# Patient Record
Sex: MALE | URBAN - METROPOLITAN AREA
[De-identification: names, ages, dates, MRNs, and addresses within clinical notes are randomized per-mention and may not be internally consistent; named-entity substitution may affect disease eponyms.]

---

## 2023-10-19 ENCOUNTER — HOSPITAL ENCOUNTER (EMERGENCY)
Facility: HOSPITAL | Age: 83
Discharge: HOME | End: 2023-10-19
Attending: EMERGENCY MEDICINE
Payer: MEDICARE

## 2023-10-19 ENCOUNTER — APPOINTMENT (OUTPATIENT)
Dept: RADIOLOGY | Facility: HOSPITAL | Age: 83
End: 2023-10-19
Payer: MEDICARE

## 2023-10-19 VITALS
HEART RATE: 97 BPM | DIASTOLIC BLOOD PRESSURE: 84 MMHG | OXYGEN SATURATION: 98 % | HEIGHT: 68 IN | WEIGHT: 231.48 LBS | TEMPERATURE: 96.4 F | SYSTOLIC BLOOD PRESSURE: 153 MMHG | RESPIRATION RATE: 18 BRPM | BODY MASS INDEX: 35.08 KG/M2

## 2023-10-19 DIAGNOSIS — S09.90XA HEAD TRAUMA, INITIAL ENCOUNTER: ICD-10-CM

## 2023-10-19 DIAGNOSIS — S01.01XA SCALP LACERATION, INITIAL ENCOUNTER: Primary | ICD-10-CM

## 2023-10-19 LAB
ALBUMIN SERPL BCP-MCNC: 3.9 G/DL (ref 3.4–5)
ALP SERPL-CCNC: 42 U/L (ref 33–136)
ALT SERPL W P-5'-P-CCNC: 21 U/L (ref 10–52)
ANION GAP SERPL CALC-SCNC: 13 MMOL/L (ref 10–20)
APPEARANCE UR: ABNORMAL
AST SERPL W P-5'-P-CCNC: 19 U/L (ref 9–39)
BACTERIA #/AREA URNS AUTO: ABNORMAL /HPF
BASOPHILS # BLD AUTO: 0.04 X10*3/UL (ref 0–0.1)
BASOPHILS NFR BLD AUTO: 0.3 %
BILIRUB SERPL-MCNC: 0.7 MG/DL (ref 0–1.2)
BILIRUB UR STRIP.AUTO-MCNC: NEGATIVE MG/DL
BUN SERPL-MCNC: 32 MG/DL (ref 6–23)
CALCIUM SERPL-MCNC: 9 MG/DL (ref 8.6–10.3)
CARDIAC TROPONIN I PNL SERPL HS: 14 NG/L (ref 0–20)
CHLORIDE SERPL-SCNC: 105 MMOL/L (ref 98–107)
CO2 SERPL-SCNC: 21 MMOL/L (ref 21–32)
COLOR UR: ABNORMAL
CREAT SERPL-MCNC: 1.46 MG/DL (ref 0.5–1.3)
EOSINOPHIL # BLD AUTO: 0.12 X10*3/UL (ref 0–0.4)
EOSINOPHIL NFR BLD AUTO: 0.8 %
ERYTHROCYTE [DISTWIDTH] IN BLOOD BY AUTOMATED COUNT: 13.2 % (ref 11.5–14.5)
GFR SERPL CREATININE-BSD FRML MDRD: 47 ML/MIN/1.73M*2
GLUCOSE SERPL-MCNC: 174 MG/DL (ref 74–99)
GLUCOSE UR STRIP.AUTO-MCNC: NEGATIVE MG/DL
HCT VFR BLD AUTO: 40.2 % (ref 41–52)
HGB BLD-MCNC: 13 G/DL (ref 13.5–17.5)
IMM GRANULOCYTES # BLD AUTO: 0.09 X10*3/UL (ref 0–0.5)
IMM GRANULOCYTES NFR BLD AUTO: 0.6 % (ref 0–0.9)
KETONES UR STRIP.AUTO-MCNC: ABNORMAL MG/DL
LEUKOCYTE ESTERASE UR QL STRIP.AUTO: NEGATIVE
LYMPHOCYTES # BLD AUTO: 1.55 X10*3/UL (ref 0.8–3)
LYMPHOCYTES NFR BLD AUTO: 10 %
MCH RBC QN AUTO: 30.7 PG (ref 26–34)
MCHC RBC AUTO-ENTMCNC: 32.3 G/DL (ref 32–36)
MCV RBC AUTO: 95 FL (ref 80–100)
MONOCYTES # BLD AUTO: 1.02 X10*3/UL (ref 0.05–0.8)
MONOCYTES NFR BLD AUTO: 6.6 %
NEUTROPHILS # BLD AUTO: 12.65 X10*3/UL (ref 1.6–5.5)
NEUTROPHILS NFR BLD AUTO: 81.7 %
NITRITE UR QL STRIP.AUTO: POSITIVE
NRBC BLD-RTO: 0 /100 WBCS (ref 0–0)
PH UR STRIP.AUTO: 6 [PH]
PLATELET # BLD AUTO: 184 X10*3/UL (ref 150–450)
PMV BLD AUTO: 10.5 FL (ref 7.5–11.5)
POTASSIUM SERPL-SCNC: 5.2 MMOL/L (ref 3.5–5.3)
PROT SERPL-MCNC: 7.2 G/DL (ref 6.4–8.2)
PROT UR STRIP.AUTO-MCNC: NEGATIVE MG/DL
RBC # BLD AUTO: 4.24 X10*6/UL (ref 4.5–5.9)
RBC # UR STRIP.AUTO: ABNORMAL /UL
RBC #/AREA URNS AUTO: >20 /HPF
SODIUM SERPL-SCNC: 134 MMOL/L (ref 136–145)
SP GR UR STRIP.AUTO: 1.02
SQUAMOUS #/AREA URNS AUTO: ABNORMAL /HPF
UROBILINOGEN UR STRIP.AUTO-MCNC: 2 MG/DL
WBC # BLD AUTO: 15.5 X10*3/UL (ref 4.4–11.3)
WBC #/AREA URNS AUTO: ABNORMAL /HPF

## 2023-10-19 PROCEDURE — 81001 URINALYSIS AUTO W/SCOPE: CPT | Performed by: PHYSICIAN ASSISTANT

## 2023-10-19 PROCEDURE — 96360 HYDRATION IV INFUSION INIT: CPT

## 2023-10-19 PROCEDURE — 36415 COLL VENOUS BLD VENIPUNCTURE: CPT | Performed by: PHYSICIAN ASSISTANT

## 2023-10-19 PROCEDURE — 99285 EMERGENCY DEPT VISIT HI MDM: CPT | Mod: 25 | Performed by: EMERGENCY MEDICINE

## 2023-10-19 PROCEDURE — 70450 CT HEAD/BRAIN W/O DYE: CPT | Performed by: RADIOLOGY

## 2023-10-19 PROCEDURE — 80053 COMPREHEN METABOLIC PANEL: CPT | Performed by: PHYSICIAN ASSISTANT

## 2023-10-19 PROCEDURE — 87086 URINE CULTURE/COLONY COUNT: CPT | Mod: AHULAB,CMCLAB | Performed by: PHYSICIAN ASSISTANT

## 2023-10-19 PROCEDURE — 85025 COMPLETE CBC W/AUTO DIFF WBC: CPT | Performed by: PHYSICIAN ASSISTANT

## 2023-10-19 PROCEDURE — 71046 X-RAY EXAM CHEST 2 VIEWS: CPT | Mod: FOREIGN READ | Performed by: RADIOLOGY

## 2023-10-19 PROCEDURE — 70450 CT HEAD/BRAIN W/O DYE: CPT

## 2023-10-19 PROCEDURE — 12002 RPR S/N/AX/GEN/TRNK2.6-7.5CM: CPT

## 2023-10-19 PROCEDURE — 71046 X-RAY EXAM CHEST 2 VIEWS: CPT | Mod: FR

## 2023-10-19 PROCEDURE — 72125 CT NECK SPINE W/O DYE: CPT | Performed by: RADIOLOGY

## 2023-10-19 PROCEDURE — 2500000004 HC RX 250 GENERAL PHARMACY W/ HCPCS (ALT 636 FOR OP/ED): Performed by: PHYSICIAN ASSISTANT

## 2023-10-19 PROCEDURE — 72125 CT NECK SPINE W/O DYE: CPT

## 2023-10-19 PROCEDURE — 84484 ASSAY OF TROPONIN QUANT: CPT | Performed by: PHYSICIAN ASSISTANT

## 2023-10-19 RX ADMIN — SODIUM CHLORIDE 500 ML: 9 INJECTION, SOLUTION INTRAVENOUS at 20:58

## 2023-10-19 ASSESSMENT — COLUMBIA-SUICIDE SEVERITY RATING SCALE - C-SSRS
2. HAVE YOU ACTUALLY HAD ANY THOUGHTS OF KILLING YOURSELF?: NO
6. HAVE YOU EVER DONE ANYTHING, STARTED TO DO ANYTHING, OR PREPARED TO DO ANYTHING TO END YOUR LIFE?: NO
1. IN THE PAST MONTH, HAVE YOU WISHED YOU WERE DEAD OR WISHED YOU COULD GO TO SLEEP AND NOT WAKE UP?: NO

## 2023-10-19 ASSESSMENT — PAIN SCALES - GENERAL: PAINLEVEL_OUTOF10: 0 - NO PAIN

## 2023-10-19 ASSESSMENT — PAIN - FUNCTIONAL ASSESSMENT: PAIN_FUNCTIONAL_ASSESSMENT: 0-10

## 2023-10-19 ASSESSMENT — PAIN DESCRIPTION - LOCATION: LOCATION: HEAD

## 2023-10-20 NOTE — ED PROCEDURE NOTE
Procedure  Laceration Repair    Performed by: Maria R Grimaldo MD  Authorized by: Antony Mackay MD    Consent:     Consent obtained:  Verbal    Consent given by:  Patient    Risks, benefits, and alternatives were discussed: yes      Risks discussed:  Infection, poor cosmetic result, pain, need for additional repair and poor wound healing    Alternatives discussed:  Observation  Universal protocol:     Procedure explained and questions answered to patient or proxy's satisfaction: yes      Relevant documents present and verified: yes      Test results available: yes      Imaging studies available: yes      Required blood products, implants, devices, and special equipment available: yes      Site/side marked: yes      Immediately prior to procedure, a time out was called: yes      Patient identity confirmed:  Verbally with patient, arm band and hospital-assigned identification number  Anesthesia:     Anesthesia method:  Local infiltration    Local anesthetic:  Lidocaine 1% w/o epi  Laceration details:     Location:  Scalp    Scalp location:  Frontal    Length (cm):  4    Depth (mm):  1  Pre-procedure details:     Preparation:  Patient was prepped and draped in usual sterile fashion and imaging obtained to evaluate for foreign bodies  Exploration:     Limited defect created (wound extended): no      Hemostasis achieved with:  Direct pressure    Imaging outcome: foreign body not noted      Wound exploration: entire depth of wound visualized      Wound extent: no nerve damage noted, no tendon damage noted, no underlying fracture noted and no vascular damage noted      Contaminated: no    Treatment:     Area cleansed with:  Saline    Amount of cleaning:  Extensive    Irrigation solution:  Sterile saline    Irrigation method:  Pressure wash  Skin repair:     Repair method:  Sutures    Suture size:  4-0    Suture technique:  Simple interrupted    Number of sutures:  6  Approximation:     Approximation:  Close  Repair  type:     Repair type:  Simple  Post-procedure details:     Dressing:  Antibiotic ointment    Procedure completion:  Tolerated well, no immediate complications               Maria R Grimaldo MD  Resident  10/19/23 2012       Maira R Grimaldo MD  Resident  10/19/23 1058

## 2023-10-20 NOTE — ED PROVIDER NOTES
CC: Laceration (1 1/2 inch lac to forehead he was at the gas station and went to  his gas cap and lost his ballance )     HPI:   Patient is a 82-year-old male with past medical history of hypertension, diabetes, A-fib presenting due to a laceration on his left forehead.  Patient reports that he was feeling gas and when he went to  the gas Off the floor he lost his balance and fell on his left side and sustained a laceration.  Patient's family reports that he gets lightheaded when he changes positions rapidly from bending over to standing up quickly.  They believe this is what happened to him earlier this evening.  Patient's last tetanus was 3 years ago and is up-to-date.  Patient has had sustained multiple lacerations over the years due to losing his balance and has not been formally worked up yet.  Patient otherwise denies any chest pain, shortness of breath, abdominal pain, pain or weakness in his upper or lower extremities.  Patient has a mild headache and some neck pain that is not midline in his C-spine.  Patient takes all of his medications at baseline.    Limitations to History: none  Additional History Obtained from: family at bedside    PMHx/PSHx:  Per HPI.   - has no past medical history on file.  - has no past surgical history on file.    Social History:  - Tobacco:  has no history on file for tobacco use.   - Alcohol:  has no history on file for alcohol use.   - Drugs:  has no history on file for drug use.     Medications: Reviewed in EMR.     Allergies:  Patient has no known allergies.    ???????????????????????????????????????????????????????????????  Triage Vitals:  T 35.8 °C (96.4 °F)  HR 96  BP    RR (!) 95  O2 95 %      Physical Exam  Constitutional:       Appearance: Normal appearance.   HENT:      Head: Normocephalic.      Comments: 4 cm laceration to L frontal forehead that is minimally oozing.      Nose: No congestion or rhinorrhea.      Mouth/Throat:      Pharynx: Oropharynx  is clear.   Eyes:      General: No scleral icterus.     Extraocular Movements: Extraocular movements intact.      Conjunctiva/sclera: Conjunctivae normal.      Pupils: Pupils are equal, round, and reactive to light.   Cardiovascular:      Rate and Rhythm: Normal rate. Rhythm irregular.      Pulses: Normal pulses.      Heart sounds: Normal heart sounds. No murmur heard.     No friction rub. No gallop.   Pulmonary:      Effort: Pulmonary effort is normal. No respiratory distress.      Breath sounds: Normal breath sounds. No wheezing, rhonchi or rales.   Abdominal:      General: Abdomen is flat. There is no distension.      Palpations: Abdomen is soft.      Tenderness: There is no abdominal tenderness. There is no guarding.   Musculoskeletal:         General: No swelling. Normal range of motion.      Cervical back: Normal range of motion. Tenderness (to paraspinal neck) present.   Skin:     General: Skin is warm and dry.      Capillary Refill: Capillary refill takes less than 2 seconds.   Neurological:      General: No focal deficit present.      Mental Status: He is alert and oriented to person, place, and time.      Motor: No weakness.      Coordination: Coordination normal.      Gait: Gait normal.     ???????????????????????????????????????????????????????????????    ED Course/Medical Decision Making:  Patient is a 82-year-old male with past medical history of hypertension, diabetes, A-fib presenting due to laceration on his forehead.  Basic lab work and CT of the head and C-spine were obtained.  Patient has nonspecific leukocytosis that is likely secondary to trauma along with mild creatinine of one-point which is likely secondary to his hypertension and diabetes he may be underlying CKD. Patient's tetanus was last updated 3 years ago, so it was not given during this presentation. Patient patient is able to tolerate oral intake and laceration was repaired.  Patient was given wound care instructions and told to  follow-up in 1 week with his PCP to follow-up for further neurological testing regarding his dizzy episodes that caused him to fall over.  Patient's family is able to take care of him and watch over him and reports that they will ensure that if he has any altered mentation, confusion they will bring him back for further evaluation.  Patient family felt comfortable for discharge and patient's prior records from earlier in September were obtained and reviewed that showed his A1c level  Patient had improved since April.    External records reviewed: recent inpatient, clinic, and prior ED notes  Diagnostic imaging independently reviewed/interpreted by me (as reflected in MDM) includes: CT Head and C-spine  Social Determinants Affecting Care:   Discussion of management with other providers: attending  Prescription Drug Consideration: bacitracin  Escalation of Care: none    Impression:   Laceration  Head trauma    Disposition: Discharge      Procedures ? Smart[employer] last updated 10/19/2023 8:12 PM        Maria R Grimaldo MD  Resident  10/21/23 0940

## 2023-10-21 LAB — BACTERIA UR CULT: NORMAL

## 2024-11-06 ENCOUNTER — HOSPITAL ENCOUNTER (EMERGENCY)
Facility: HOSPITAL | Age: 84
Discharge: HOME | End: 2024-11-07
Attending: EMERGENCY MEDICINE
Payer: MEDICARE

## 2024-11-06 ENCOUNTER — APPOINTMENT (OUTPATIENT)
Dept: CARDIOLOGY | Facility: HOSPITAL | Age: 84
End: 2024-11-06
Payer: MEDICARE

## 2024-11-06 ENCOUNTER — APPOINTMENT (OUTPATIENT)
Dept: RADIOLOGY | Facility: HOSPITAL | Age: 84
End: 2024-11-06
Payer: MEDICARE

## 2024-11-06 VITALS
TEMPERATURE: 97.8 F | SYSTOLIC BLOOD PRESSURE: 126 MMHG | OXYGEN SATURATION: 97 % | DIASTOLIC BLOOD PRESSURE: 54 MMHG | WEIGHT: 255 LBS | BODY MASS INDEX: 40.02 KG/M2 | RESPIRATION RATE: 20 BRPM | HEIGHT: 67 IN | HEART RATE: 72 BPM

## 2024-11-06 DIAGNOSIS — R42 DIZZINESS: ICD-10-CM

## 2024-11-06 DIAGNOSIS — R05.9 COUGH, UNSPECIFIED TYPE: ICD-10-CM

## 2024-11-06 DIAGNOSIS — R53.81 MALAISE: Primary | ICD-10-CM

## 2024-11-06 LAB
ALBUMIN SERPL BCP-MCNC: 3.8 G/DL (ref 3.4–5)
ALP SERPL-CCNC: 45 U/L (ref 33–136)
ALT SERPL W P-5'-P-CCNC: 34 U/L (ref 10–52)
ANION GAP SERPL CALC-SCNC: 15 MMOL/L (ref 10–20)
AST SERPL W P-5'-P-CCNC: 32 U/L (ref 9–39)
BASOPHILS # BLD AUTO: 0.06 X10*3/UL (ref 0–0.1)
BASOPHILS NFR BLD AUTO: 0.5 %
BILIRUB SERPL-MCNC: 0.5 MG/DL (ref 0–1.2)
BNP SERPL-MCNC: 43 PG/ML (ref 0–99)
BUN SERPL-MCNC: 48 MG/DL (ref 6–23)
CALCIUM SERPL-MCNC: 9 MG/DL (ref 8.6–10.3)
CARDIAC TROPONIN I PNL SERPL HS: 8 NG/L (ref 0–20)
CARDIAC TROPONIN I PNL SERPL HS: 8 NG/L (ref 0–20)
CHLORIDE SERPL-SCNC: 106 MMOL/L (ref 98–107)
CO2 SERPL-SCNC: 19 MMOL/L (ref 21–32)
CREAT SERPL-MCNC: 1.59 MG/DL (ref 0.5–1.3)
EGFRCR SERPLBLD CKD-EPI 2021: 42 ML/MIN/1.73M*2
EOSINOPHIL # BLD AUTO: 0.01 X10*3/UL (ref 0–0.4)
EOSINOPHIL NFR BLD AUTO: 0.1 %
ERYTHROCYTE [DISTWIDTH] IN BLOOD BY AUTOMATED COUNT: 13.2 % (ref 11.5–14.5)
FLUAV RNA RESP QL NAA+PROBE: NOT DETECTED
FLUBV RNA RESP QL NAA+PROBE: NOT DETECTED
GLUCOSE SERPL-MCNC: 305 MG/DL (ref 74–99)
HCT VFR BLD AUTO: 38.7 % (ref 41–52)
HGB BLD-MCNC: 12.4 G/DL (ref 13.5–17.5)
IMM GRANULOCYTES # BLD AUTO: 0.22 X10*3/UL (ref 0–0.5)
IMM GRANULOCYTES NFR BLD AUTO: 1.8 % (ref 0–0.9)
LIPASE SERPL-CCNC: 35 U/L (ref 9–82)
LYMPHOCYTES # BLD AUTO: 1.39 X10*3/UL (ref 0.8–3)
LYMPHOCYTES NFR BLD AUTO: 11.1 %
MAGNESIUM SERPL-MCNC: 2.08 MG/DL (ref 1.6–2.4)
MCH RBC QN AUTO: 31 PG (ref 26–34)
MCHC RBC AUTO-ENTMCNC: 32 G/DL (ref 32–36)
MCV RBC AUTO: 97 FL (ref 80–100)
MONOCYTES # BLD AUTO: 1.02 X10*3/UL (ref 0.05–0.8)
MONOCYTES NFR BLD AUTO: 8.2 %
NEUTROPHILS # BLD AUTO: 9.79 X10*3/UL (ref 1.6–5.5)
NEUTROPHILS NFR BLD AUTO: 78.3 %
NRBC BLD-RTO: 0 /100 WBCS (ref 0–0)
PLATELET # BLD AUTO: 248 X10*3/UL (ref 150–450)
POTASSIUM SERPL-SCNC: 7.7 MMOL/L (ref 3.5–5.3)
PROT SERPL-MCNC: 6.6 G/DL (ref 6.4–8.2)
RBC # BLD AUTO: 4 X10*6/UL (ref 4.5–5.9)
SARS-COV-2 RNA RESP QL NAA+PROBE: NOT DETECTED
SODIUM SERPL-SCNC: 132 MMOL/L (ref 136–145)
TSH SERPL-ACNC: 0.68 MIU/L (ref 0.44–3.98)
WBC # BLD AUTO: 12.5 X10*3/UL (ref 4.4–11.3)

## 2024-11-06 PROCEDURE — 71046 X-RAY EXAM CHEST 2 VIEWS: CPT

## 2024-11-06 PROCEDURE — 84484 ASSAY OF TROPONIN QUANT: CPT | Performed by: EMERGENCY MEDICINE

## 2024-11-06 PROCEDURE — 83735 ASSAY OF MAGNESIUM: CPT | Performed by: EMERGENCY MEDICINE

## 2024-11-06 PROCEDURE — 99283 EMERGENCY DEPT VISIT LOW MDM: CPT | Mod: 25

## 2024-11-06 PROCEDURE — 71046 X-RAY EXAM CHEST 2 VIEWS: CPT | Performed by: RADIOLOGY

## 2024-11-06 PROCEDURE — 84132 ASSAY OF SERUM POTASSIUM: CPT | Performed by: EMERGENCY MEDICINE

## 2024-11-06 PROCEDURE — 93005 ELECTROCARDIOGRAM TRACING: CPT

## 2024-11-06 PROCEDURE — 87636 SARSCOV2 & INF A&B AMP PRB: CPT | Performed by: EMERGENCY MEDICINE

## 2024-11-06 PROCEDURE — 36415 COLL VENOUS BLD VENIPUNCTURE: CPT | Performed by: EMERGENCY MEDICINE

## 2024-11-06 PROCEDURE — 83880 ASSAY OF NATRIURETIC PEPTIDE: CPT | Performed by: EMERGENCY MEDICINE

## 2024-11-06 PROCEDURE — 85025 COMPLETE CBC W/AUTO DIFF WBC: CPT | Performed by: EMERGENCY MEDICINE

## 2024-11-06 PROCEDURE — 83690 ASSAY OF LIPASE: CPT | Performed by: EMERGENCY MEDICINE

## 2024-11-06 PROCEDURE — 84443 ASSAY THYROID STIM HORMONE: CPT | Performed by: EMERGENCY MEDICINE

## 2024-11-06 PROCEDURE — 80053 COMPREHEN METABOLIC PANEL: CPT | Performed by: EMERGENCY MEDICINE

## 2024-11-06 ASSESSMENT — COLUMBIA-SUICIDE SEVERITY RATING SCALE - C-SSRS
6. HAVE YOU EVER DONE ANYTHING, STARTED TO DO ANYTHING, OR PREPARED TO DO ANYTHING TO END YOUR LIFE?: NO
2. HAVE YOU ACTUALLY HAD ANY THOUGHTS OF KILLING YOURSELF?: NO
1. IN THE PAST MONTH, HAVE YOU WISHED YOU WERE DEAD OR WISHED YOU COULD GO TO SLEEP AND NOT WAKE UP?: NO

## 2024-11-06 ASSESSMENT — PAIN DESCRIPTION - LOCATION: LOCATION: ABDOMEN

## 2024-11-06 ASSESSMENT — PAIN DESCRIPTION - PAIN TYPE: TYPE: ACUTE PAIN

## 2024-11-06 ASSESSMENT — PAIN - FUNCTIONAL ASSESSMENT: PAIN_FUNCTIONAL_ASSESSMENT: 0-10

## 2024-11-06 ASSESSMENT — PAIN SCALES - GENERAL: PAINLEVEL_OUTOF10: 6

## 2024-11-07 LAB
ATRIAL RATE: 90 BPM
P AXIS: 84 DEGREES
P OFFSET: 127 MS
P ONSET: 90 MS
POTASSIUM SERPL-SCNC: 6.7 MMOL/L (ref 3.5–5.3)
PR INTERVAL: 232 MS
Q ONSET: 206 MS
QRS COUNT: 15 BEATS
QRS DURATION: 144 MS
QT INTERVAL: 382 MS
QTC CALCULATION(BAZETT): 467 MS
QTC FREDERICIA: 437 MS
R AXIS: -78 DEGREES
T AXIS: 72 DEGREES
T OFFSET: 397 MS
VENTRICULAR RATE: 90 BPM

## 2024-11-07 PROCEDURE — 36415 COLL VENOUS BLD VENIPUNCTURE: CPT | Performed by: EMERGENCY MEDICINE

## 2024-11-07 NOTE — ED PROVIDER NOTES
Emergency Department Provider Note             History of Present Illness   CC: Dizziness, Cough, and Abdominal Pain    History provided by: Patient and Family Member  Limitations to History: None    HPI:  Jeff Alejo is a 83 y.o. male with history including atrial flutter, hypertension, hyperlipidemia, diabetes, BPH, CKD, heart failure, osteoarthritis presenting to the emergency department for malaise, cough, episode of dizziness. Also had mild abdominal discomfort. States he's recovering from pneumonia, on antibiotics and steroids, but still not feeling back to his baseline. Denies fever, chills, chest pain, dyspnea, vomiting, diarrhea, constipation, focal deficits. States he's been taking his medications.     ---    No Known Allergies    Physical Exam   Triage vitals:  T 36.6 °C (97.8 °F)  HR 91  /67  RR 20  O2 96 % None (Room air)    General: awake, well-appearing, no distress  Head: normocephalic, atraumatic  Eyes: pupils equal, conjunctival injection or scleral icterus  ENT: nares patent, moist mucous membranes  Neck: supple, trachea midline, no masses  CV: regular rate and rhythm, well-perfused  Resp: breathing is non-labored, speaking in full sentences. Lungs are clear to auscultation bilaterally  GI: soft, non-distended, non-tender, no rebound or guarding  Extremities: no edema, no gross deformity   Neuro: alert, oriented, speech is fluent, face is symmetric, extraocular movements intact, no nystagmus, moving all extremities with symmetric strength and sensation, no ataxia  Psych: Appropriate mood and affect    ED Course & Medical Decision Making     83 y.o. male with history including atrial flutter, hypertension, hyperlipidemia, diabetes, BPH, CKD, heart failure, osteoarthritis presenting to the emergency department for malaise, cough, mild abdominal discomfort, episode of dizziness. Vitals are stable. He's well-perfused, lungs are clear, he's speaking in full sentences. Neurovascularly  intact. Abdominal exam is unremarkable. Given his reassuring exam and vague abdominal symptoms, will hold off on abdominal imaging as I have low suspicion for acute pathology. He was offered symptomatic management but declines any medications. See ED course below for further details.     External Records Reviewed: family medicine note from 8/23/24    Social Determinants Limiting Care: None identified    EKG: See ED course.     Results: Independently reviewed and interpreted by me. Please see ED course and [facility] for my full interpretation.     Chronic Medical Conditions Significantly Affecting Care: As documented above in [facility]    Patient was discussed with the following consultants/services: None     Care Considerations: As documented above in [facility]    ED Course:  ED Course as of 11/09/24 1647   Wed Nov 06, 2024 2035 EKG per my interpretation reveals sinus rhythm, first-degree AV block, rate 90, left axis deviation, bifascicular block, no significant ST elevation/depression or T wave abnormalities. No significant change compared to prior EKG. [LM]   2231 Labs are notable for hyperkalemia of 7.7 with mild hemolysis.  Bicarb slightly low at 19.  No anion gap. Renal function not significantly increased from prior labs. BNP and troponin are normal. Viral swabs negative. Minimal leukocytosis of 12.5. [LM]   Thu Nov 07, 2024   0100 Obtained another potassium which was also hemolyzed, now 6.7. The patient declined further attempts but I reiterated my concerns and risks of hyperkalemia, including life-threatening arrhythmia. He expresses understanding and is in agreement with one more attempt. I reviewed his chest x-ray which shows no acute process.  [LM]   0223  the lab notified us that his third sample was hemolyzed and requested an additional sample.  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 He has an appointment  with his PCP tomorrow and will ensure he will have his level rechecked. The etiology of his symptoms is not entirely clear. Possibly residual pneumonia, though not present on x-ray today. Possibly viral illness. Lower suspicion for acute intracranial process, abdominal process, cardiac cause based on results and overall reassuring exam. I advised close monitoring, following with PCP, and returning with worsening or new and concerning symptoms.  [LM]   0224 Before I was able to print written discharge instructions, they left the ED.  [LM]      ED Course User Index  [LM] Ave Adams MD         Diagnoses as of 11/09/24 1647   Malaise   Dizziness   Cough, unspecified type     Disposition   Discharge - prior to receiving formal discharge instructions    MD Ave Murguia MD  11/09/24 1647

## 2024-11-07 NOTE — ED TRIAGE NOTES
Pt here via ems from home with c/o generally not feeling well. Pt is getting over pneumonia. Pt is on abx and steroids. Pt had an episode of dizziness and states his stomach hurts.

## 2024-11-15 LAB
ATRIAL RATE: 90 BPM
P AXIS: 84 DEGREES
P OFFSET: 127 MS
P ONSET: 90 MS
PR INTERVAL: 232 MS
Q ONSET: 206 MS
QRS COUNT: 15 BEATS
QRS DURATION: 144 MS
QT INTERVAL: 382 MS
QTC CALCULATION(BAZETT): 467 MS
QTC FREDERICIA: 437 MS
R AXIS: -78 DEGREES
T AXIS: 72 DEGREES
T OFFSET: 397 MS
VENTRICULAR RATE: 90 BPM